# Patient Record
Sex: MALE | Race: WHITE | URBAN - METROPOLITAN AREA
[De-identification: names, ages, dates, MRNs, and addresses within clinical notes are randomized per-mention and may not be internally consistent; named-entity substitution may affect disease eponyms.]

---

## 2021-06-21 ENCOUNTER — OFFICE VISIT (OUTPATIENT)
Dept: URBAN - METROPOLITAN AREA CLINIC 23 | Facility: CLINIC | Age: 41
End: 2021-06-21

## 2021-06-22 ENCOUNTER — DASHBOARD ENCOUNTERS (OUTPATIENT)
Age: 41
End: 2021-06-22

## 2021-06-22 ENCOUNTER — WEB ENCOUNTER (OUTPATIENT)
Dept: URBAN - METROPOLITAN AREA CLINIC 23 | Facility: CLINIC | Age: 41
End: 2021-06-22

## 2021-06-22 ENCOUNTER — OFFICE VISIT (OUTPATIENT)
Dept: URBAN - METROPOLITAN AREA CLINIC 23 | Facility: CLINIC | Age: 41
End: 2021-06-22
Payer: COMMERCIAL

## 2021-06-22 DIAGNOSIS — R10.31 RLQ ABDOMINAL PAIN: ICD-10-CM

## 2021-06-22 DIAGNOSIS — K21.9 GERD: ICD-10-CM

## 2021-06-22 DIAGNOSIS — R19.09 INGUINAL BULGE: ICD-10-CM

## 2021-06-22 PROCEDURE — 99204 OFFICE O/P NEW MOD 45 MIN: CPT | Performed by: INTERNAL MEDICINE

## 2021-06-22 RX ORDER — AZELASTINE HYDROCHLORIDE AND FLUTICASONE PROPIONATE 137; 50 UG/1; UG/1
1 SPRAY IN EACH NOSTRIL SPRAY, METERED NASAL TWICE A DAY
Status: ACTIVE | COMMUNITY

## 2021-06-22 NOTE — HPI-TODAY'S VISIT:
41-year-old male presented for evaluation of right lower quadrant abdominal pain and reflux.  He reports he had hernia surgery about a year ago after the surgery started having pain in the inguinal area where the mesh was placed pain worse when he stands for too long the pain radiate to the thigh area as well.  No diarrhea no blood in the stool no vomiting he was seen by the surgeon recommend redo of the mesh surgery if needed.  He has CT scan in January 2021 which was unremarkable.  He was told the pain most likely from the mesh around the nerve in the inguinal area.  No family history of colon cancer no blood in the stool. He also had episode of chest pain in March 2021 he had cardiac work-up was unremarkable he was told he has reflux treated with PPI for 6 weeks now he feels better no recurrent symptoms since then

## 2021-06-24 PROBLEM — 235595009 GASTROESOPHAGEAL REFLUX DISEASE: Status: ACTIVE | Noted: 2021-06-24

## 2021-06-25 ENCOUNTER — OFFICE VISIT (OUTPATIENT)
Dept: URBAN - METROPOLITAN AREA CLINIC 23 | Facility: CLINIC | Age: 41
End: 2021-06-25